# Patient Record
(demographics unavailable — no encounter records)

---

## 2025-07-03 NOTE — HISTORY OF PRESENT ILLNESS
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  History of Present Illness:  Forehead lesion has been present for approximately 15 years. Mole on the left cheek has been present since birth. Both lesions have been slowly enlarging over time without any associated changes in color. No associated symptoms such as itching, bleeding, drainage, pain, or discomfort. No signs of infection or inflammation. No prior treatments or interventions. Patient has a personal history of moles but denies any family history of skin cancer. Patient has previously been evaluated by Dermatology. No imaging or biopsy has been performed to date.

## 2025-07-15 NOTE — PROCEDURE
[FreeTextEntry6] : Preopdx: scalp  mass Procedure: excisional biopsy  3.1 cm, and complex closure 3.1 cm scalp Anesthesia: local 1% w/epi Specimens: to path on formalin No complications   Summary: IC obtained.  Lesion demarcated with marking pen.  1%lido with epinephrine injected.  15 blade used to incise full thickness.    3.1Cm  lesion excised in subcutaneous plane.   Hemostasis obtained with cautery.  Skin edges widely undermined and closed for a complex closure of  3.1 cm.  bacitracin and steristrips placed.

## 2025-07-29 NOTE — HISTORY OF PRESENT ILLNESS
[FreeTextEntry1] : DOP 07/15/25 excisional biopsy of scalp growth. Final Diagnosis Scalp, excision:      - Ruptured epidermal cyst